# Patient Record
Sex: MALE | Race: WHITE | NOT HISPANIC OR LATINO | URBAN - NONMETROPOLITAN AREA
[De-identification: names, ages, dates, MRNs, and addresses within clinical notes are randomized per-mention and may not be internally consistent; named-entity substitution may affect disease eponyms.]

---

## 2021-04-06 ENCOUNTER — OFFICE VISIT (OUTPATIENT)
Dept: FAMILY MEDICINE CLINIC | Facility: CLINIC | Age: 41
End: 2021-04-06

## 2021-04-06 VITALS
TEMPERATURE: 98 F | OXYGEN SATURATION: 96 % | DIASTOLIC BLOOD PRESSURE: 84 MMHG | RESPIRATION RATE: 20 BRPM | WEIGHT: 191.8 LBS | BODY MASS INDEX: 25.42 KG/M2 | SYSTOLIC BLOOD PRESSURE: 132 MMHG | HEIGHT: 73 IN | HEART RATE: 92 BPM

## 2021-04-06 DIAGNOSIS — R36.9 PENILE DISCHARGE: Primary | ICD-10-CM

## 2021-04-06 LAB
BILIRUB BLD-MCNC: NEGATIVE MG/DL
C TRACH RRNA CVX QL NAA+PROBE: DETECTED
CLARITY, POC: ABNORMAL
COLOR UR: YELLOW
GLUCOSE UR STRIP-MCNC: NEGATIVE MG/DL
KETONES UR QL: NEGATIVE
LEUKOCYTE EST, POC: ABNORMAL
N GONORRHOEA RRNA SPEC QL NAA+PROBE: DETECTED
NITRITE UR-MCNC: NEGATIVE MG/ML
PH UR: 6 [PH] (ref 5–8)
PROT UR STRIP-MCNC: ABNORMAL MG/DL
RBC # UR STRIP: ABNORMAL /UL
SP GR UR: 1.03 (ref 1–1.03)
UROBILINOGEN UR QL: NORMAL

## 2021-04-06 PROCEDURE — 96372 THER/PROPH/DIAG INJ SC/IM: CPT | Performed by: FAMILY MEDICINE

## 2021-04-06 PROCEDURE — 99203 OFFICE O/P NEW LOW 30 MIN: CPT | Performed by: FAMILY MEDICINE

## 2021-04-06 PROCEDURE — 87591 N.GONORRHOEAE DNA AMP PROB: CPT | Performed by: FAMILY MEDICINE

## 2021-04-06 PROCEDURE — 87491 CHLMYD TRACH DNA AMP PROBE: CPT | Performed by: FAMILY MEDICINE

## 2021-04-06 RX ORDER — CEFTRIAXONE 500 MG/1
500 INJECTION, POWDER, FOR SOLUTION INTRAMUSCULAR; INTRAVENOUS ONCE
Status: COMPLETED | OUTPATIENT
Start: 2021-04-06 | End: 2021-04-06

## 2021-04-06 RX ORDER — DOXYCYCLINE HYCLATE 100 MG/1
100 TABLET, DELAYED RELEASE ORAL 2 TIMES DAILY
Qty: 14 TABLET | Refills: 0 | Status: SHIPPED | OUTPATIENT
Start: 2021-04-06 | End: 2021-04-13

## 2021-04-06 RX ADMIN — CEFTRIAXONE 500 MG: 500 INJECTION, POWDER, FOR SOLUTION INTRAMUSCULAR; INTRAVENOUS at 12:45

## 2021-04-06 NOTE — PATIENT INSTRUCTIONS
Gonorrhea  Gonorrhea is an STI (sexually transmitted infection) that can infect both men and women. If left untreated, this infection can:  · Damage the female or male reproductive organs.  · Cause women and men to be unable to have children (infertility).  · Harm an unborn baby if an infected woman is pregnant.  It is important to get treatment for gonorrhea as soon as possible. All of your sex partners may also need to be treated for the infection.  What are the causes?  This condition is caused by bacteria called Neisseria gonorrhoeae. The infection is spread from person to person through sexual contact, including oral, anal, and vaginal sex. A  can get the infection from his or her mother during birth.  What increases the risk?  The following factors may make you more likely to develop this condition:  · Being a woman who is younger than age 25 and who is sexually active.  · Being a man who is srivastava or bisexual and who has sex with men.  · Having a new sex partner or having multiple partners.  · Having a sex partner who has an STI.  · Not using condoms correctly or not using condoms every time you have sex.  · Having a history of STIs.  · Exchanging sex for money or drugs.  What are the signs or symptoms?  When symptoms occur, they may be different for women and men. Symptoms may include:  · Abnormal discharge from the penis or vagina. The discharge may be cloudy, thick, or yellow-green in color.  · Pain or burning when you urinate.  · Irritation, pain, bleeding, or discharge from the rectum. This may occur if the infection was spread by anal sex.  · Sore throat or swollen lymph nodes in the neck. This may occur if the infection was spread by oral sex.  · Pain or swelling in the testicles, in men.  · Bleeding between menstrual periods, in women.  In some cases, there are no symptoms.  How is this diagnosed?  This condition is diagnosed based on:  · A physical exam.  · A sample of discharge that is looked at  under a microscope to find any bacteria. The discharge may be taken from the penis, vagina, throat, or rectum.  · Urine tests.  Not all test results will be available during your visit.  How is this treated?  This condition is treated with antibiotic medicines. It is important to start treatment as soon as possible. Early treatment may prevent some problems from developing. You should not have sex during treatment. All types of sexual activity should be avoided for at least 7 days after treatment is complete and until your sex partner or partners have been treated.  Follow these instructions at home:  · Take over-the-counter and prescription medicines as told by your health care provider. Finish all antibiotic medicine even when you start to feel better.  · Do not have sex during treatment.  · Do not have sex until at least 7 days after you and your partner or partners have finished treatment and your health care provider says it is okay.  · It is up to you to get your test results. Ask your health care provider, or the department that is doing the test, when your results will be ready.  · If you get a positive result on your gonorrhea test, tell your recent sex partners. These include any partners for oral, anal, or vaginal sex. They need to be checked for gonorrhea even if they do not have symptoms. They may need treatment, even if they get negative results on their gonorrhea tests.  · Drink enough fluid to keep your urine pale yellow.  · Keep all follow-up visits as told by your health care provider. This is important.  How is this prevented?    · Use latex condoms correctly every time you have sex.  · Ask if your sex partner or partners have been tested for STIs and had negative results.  · Avoid having multiple sex partners.  · Get regular health screenings to check for STIs.  Contact a health care provider if:  · Your symptoms do not get better after a few days of taking antibiotics.  · Your symptoms get  worse.  · You develop new symptoms, including:  ? Eye irritation.  ? Joint pain or swelling.  ? Unusual rashes.  · You have a fever.  Summary  · Gonorrhea is an STI (sexually transmitted infection) that can infect both men and women.  · This infection is spread from person to person through sexual contact, including oral, anal, and vaginal sex. A  can get the infection from his or her mother during birth.  · Symptoms include abnormal discharge, pain or burning while urinating, or pain in the rectum.  · This condition is treated with antibiotic medicines. Do not have sex until at least 7 days after both you and any sex partners have completed antibiotic treatment.  · Tell your health care provider if your symptoms get worse or you have new symptoms.  This information is not intended to replace advice given to you by your health care provider. Make sure you discuss any questions you have with your health care provider.  Document Revised: 2020 Document Reviewed: 2020  [employer] Patient Education ©  [employer]    Chlamydia, Male    Chlamydia is an STD (sexually transmitted disease). It is a bacterial infection that spreads through sexual contact (is contagious). Chlamydia can occur in different areas of the body, including the tube that moves urine from the bladder out of the body (urethra), the throat, or the rectum. This condition is not difficult to treat. However, if left untreated, chlamydia can lead to more serious health problems.  What are the causes?  Chlamydia is caused by the bacteria Chlamydia trachomatis. It is passed from an infected partner during sexual activity. Chlamydia can spread through contact with the genitals, mouth, or rectum.  What are the signs or symptoms?  In some cases, there may not be any symptoms for this condition (asymptomatic), especially early in the infection. If symptoms develop, they may include:  · Burning when urinating.  · Urinating frequently.  · Pain  or swelling in the testicles.  · Watery, mucus-like discharge from the penis.  · Redness, soreness, and swelling (inflammation) of the rectum.  · Bleeding or discharge from the rectum.  · Abdominal pain.  · Itching, burning, or redness in the eyes, or discharge from the eyes.  How is this diagnosed?  This condition may be diagnosed based on:  · Urine tests.  · Swab tests. Depending on your symptoms, your health care provider may use a cotton swab to collect discharge from your urethra or rectum to test for the bacteria.  How is this treated?  This condition is treated with antibiotic medicines.  Follow these instructions at home:  Medicines  · Take over-the-counter and prescription medicines only as told by your health care provider.  · Take your antibiotic medicine as told by your health care provider. Do not stop taking the antibiotic even if you start to feel better.  Sexual activity  · Tell sexual partners about your infection. This includes any oral, anal, or vaginal sex partners you have had within 60 days of when your symptoms started. Sexual partners should also be treated, even if they have no signs of the disease.  · Do not have sex until you and your sexual partners have completed treatment and your health care provider says it is okay. If your health care provider prescribed you a single dose treatment, wait 7 days after taking the treatment before having sex.  General instructions  · It is your responsibility to get your test results. Ask your health care provider, or the department performing the test, when your results will be ready.  · Get plenty of rest.  · Eat a healthy, well-balanced diet.  · Drink enough fluids to keep your urine clear or pale yellow.  · Keep all follow-up visits as told by your health care provider. This is important. You may need to be tested for infection again 3 months after treatment.  How is this prevented?  The only sure way to prevent chlamydia is to avoid sexual  intercourse. However, you can lower your risk by:  · Using latex condoms correctly every time you have sexual intercourse.  · Not having multiple sexual partners.  · Asking if your sexual partner has been tested for STIs and had negative results.  Contact a health care provider if:  · You develop new symptoms or your symptoms do not get better after completing treatment.  · You have a fever or chills.  · You have pain during sexual intercourse.  · You develop new joint pain or swelling near your joints.  · You have pain or soreness in your testicles.  Get help right away if:  · Your pain gets worse and does not get better with medicine.  · You have abnormal discharge.  · You develop flu-like symptoms, such as night sweats, sore throat, or muscle aches.  Summary  · Chlamydia is an STD (sexually transmitted disease). It is a bacterial infection that spreads (is contagious) through sexual contact.  · This condition is not difficult to treat, however, if left untreated, it can lead to more serious health problems.  · In some cases, there may not be any symptoms for this condition (asymptomatic).  · This condition is treated with antibiotic medicines.  · Using latex condoms correctly every time you have sexual intercourse can help prevent chlamydia.  This information is not intended to replace advice given to you by your health care provider. Make sure you discuss any questions you have with your health care provider.  Document Revised: 01/02/2019 Document Reviewed: 12/04/2017  [employer] Patient Education © 2020 [employer]

## 2021-04-06 NOTE — PROGRESS NOTES
"Chief Complaint  Penis Pain (pain and drainage x 3 days)    Subjective          RIK Pace presents to [facility]  The patient is here because of discharge from the penis x3 days, the patient states that he has a new sexual partner and she later told him that she is taking an antibiotic for chlamydia.  The patient is also complaining of some mild groin and scrotal pain but no swelling.  He denies any burning when he urinates, no frequency or urgency, just mostly yellow, thick drainage.      Objective   Vital Signs:   /84 (BP Location: Right arm, Patient Position: Sitting, Cuff Size: Adult)   Pulse 92   Temp 98 °F (36.7 °C) (Temporal)   Resp 20   Ht 185.4 cm (73\")   Wt 87 kg (191 lb 12.8 oz)   SpO2 96%   BMI 25.30 kg/m²     Physical Exam  Vitals and nursing note reviewed.   Constitutional:       General: He is not in acute distress.     Appearance: Normal appearance. He is well-developed and well-groomed.   HENT:      Head: Normocephalic and atraumatic.      Jaw: No tenderness or pain on movement.      Salivary Glands: Right salivary gland is not diffusely enlarged. Left salivary gland is not diffusely enlarged.      Right Ear: Tympanic membrane and ear canal normal.      Left Ear: Tympanic membrane and ear canal normal.      Nose: No congestion or rhinorrhea.      Mouth/Throat:      Mouth: Mucous membranes are moist.      Pharynx: No oropharyngeal exudate or posterior oropharyngeal erythema.   Eyes:      General: Lids are normal. No allergic shiner or scleral icterus.     Conjunctiva/sclera: Conjunctivae normal.      Pupils: Pupils are equal, round, and reactive to light.   Neck:      Thyroid: No thyroid mass, thyromegaly or thyroid tenderness.      Trachea: Trachea normal.   Cardiovascular:      Rate and Rhythm: Normal rate and regular rhythm.      Pulses: Normal pulses.      Heart sounds: Normal heart sounds.   Pulmonary:      Effort: Pulmonary effort is normal. No " respiratory distress.      Breath sounds: Normal breath sounds. No wheezing or rales.   Chest:      Breasts:         Right: Normal.         Left: Normal.   Abdominal:      General: Abdomen is flat.      Palpations: Abdomen is soft.      Tenderness: There is no abdominal tenderness. There is no right CVA tenderness, left CVA tenderness, guarding or rebound.      Hernia: No hernia is present. There is no hernia in the left inguinal area or right inguinal area.   Genitourinary:     Penis: Discharge ( Yellow, purulent drainage) present. No hypospadias, erythema, tenderness, swelling or lesions.       Testes:         Right: Mass and tenderness present. Swelling, testicular hydrocele or varicocele not present. Right testis is descended.         Left: Tenderness present. Mass, swelling, testicular hydrocele or varicocele not present. Left testis is descended.      Epididymis:      Right: Not inflamed or enlarged. Tenderness present. No mass.      Left: Not inflamed or enlarged. Tenderness present. No mass.   Musculoskeletal:      Cervical back: Neck supple.   Skin:     General: Skin is warm.      Nails: There is no clubbing.   Neurological:      General: No focal deficit present.      Mental Status: He is alert and oriented to person, place, and time.   Psychiatric:         Mood and Affect: Mood normal.         Behavior: Behavior normal.        Result Review :                 Assessment and Plan    Diagnoses and all orders for this visit:    1. Penile discharge (Primary)  Comments:  ? GC/Chlamydia  Orders:  -     Chlamydia trachomatis, Neisseria gonorrhoeae, PCR - , Urine, Clean Catch  -     cefTRIAXone (ROCEPHIN) injection 500 mg  -     doxycycline (DORYX) 100 MG enteric coated tablet; Take 1 tablet by mouth 2 (Two) Times a Day for 7 days.  Dispense: 14 tablet; Refill: 0  -     POC Urinalysis Dipstick        Follow Up   Return if symptoms worsen or fail to improve.  Patient was given instructions and counseling regarding  his condition or for health maintenance advice. Please see specific information pulled into the AVS if appropriate.     The patient will be given Rocephin 500 mg IM and doxycycline 100 mg p.o. twice daily for 7 days.  The urine will be sent to check for GC and chlamydia.  Advised to increase oral fluids.  Advised to follow-up with PCP if symptoms worsen or fail to improve.